# Patient Record
Sex: FEMALE
[De-identification: names, ages, dates, MRNs, and addresses within clinical notes are randomized per-mention and may not be internally consistent; named-entity substitution may affect disease eponyms.]

---

## 2017-04-12 ENCOUNTER — CHARTING TRANS (OUTPATIENT)
Dept: OTHER | Age: 37
End: 2017-04-12

## 2017-04-13 ENCOUNTER — CHARTING TRANS (OUTPATIENT)
Dept: SPORTS MEDICINE | Age: 37
End: 2017-04-13

## 2017-04-13 ENCOUNTER — IMAGING SERVICES (OUTPATIENT)
Dept: OTHER | Age: 37
End: 2017-04-13

## 2017-05-02 ENCOUNTER — CHARTING TRANS (OUTPATIENT)
Dept: OTHER | Age: 37
End: 2017-05-02

## 2017-05-04 ENCOUNTER — CHARTING TRANS (OUTPATIENT)
Dept: OTHER | Age: 37
End: 2017-05-04

## 2017-05-18 ENCOUNTER — CHARTING TRANS (OUTPATIENT)
Dept: OTHER | Age: 37
End: 2017-05-18

## 2017-07-18 PROBLEM — E11.65 CONTROLLED TYPE 2 DIABETES MELLITUS WITH HYPERGLYCEMIA, WITHOUT LONG-TERM CURRENT USE OF INSULIN (HCC): Status: ACTIVE | Noted: 2017-07-18

## 2017-08-04 PROBLEM — K61.1 PERIRECTAL ABSCESS: Status: ACTIVE | Noted: 2017-08-04

## 2018-02-02 PROBLEM — E11.69 TYPE 2 DIABETES MELLITUS WITH HYPERLIPIDEMIA (HCC): Status: ACTIVE | Noted: 2018-02-02

## 2018-02-02 PROBLEM — E11.69 TYPE 2 DIABETES MELLITUS WITH HYPERTRIGLYCERIDEMIA (HCC): Status: ACTIVE | Noted: 2018-02-02

## 2018-02-02 PROBLEM — E78.1 TYPE 2 DIABETES MELLITUS WITH HYPERTRIGLYCERIDEMIA (HCC): Status: ACTIVE | Noted: 2018-02-02

## 2018-02-02 PROBLEM — E78.5 TYPE 2 DIABETES MELLITUS WITH HYPERLIPIDEMIA (HCC): Status: ACTIVE | Noted: 2018-02-02

## 2018-11-28 VITALS
HEART RATE: 88 BPM | HEIGHT: 67 IN | RESPIRATION RATE: 16 BRPM | WEIGHT: 210 LBS | BODY MASS INDEX: 32.96 KG/M2 | DIASTOLIC BLOOD PRESSURE: 79 MMHG | SYSTOLIC BLOOD PRESSURE: 115 MMHG

## 2025-04-23 ENCOUNTER — OFFICE VISIT (OUTPATIENT)
Facility: CLINIC | Age: 45
End: 2025-04-23
Payer: MEDICAID

## 2025-04-23 ENCOUNTER — LAB ENCOUNTER (OUTPATIENT)
Dept: LAB | Age: 45
End: 2025-04-23
Attending: Other
Payer: MEDICAID

## 2025-04-23 VITALS
HEART RATE: 86 BPM | DIASTOLIC BLOOD PRESSURE: 62 MMHG | BODY MASS INDEX: 30 KG/M2 | SYSTOLIC BLOOD PRESSURE: 104 MMHG | RESPIRATION RATE: 16 BRPM | WEIGHT: 189 LBS

## 2025-04-23 DIAGNOSIS — G62.9 POLYNEUROPATHY: Primary | ICD-10-CM

## 2025-04-23 DIAGNOSIS — G62.9 POLYNEUROPATHY: ICD-10-CM

## 2025-04-23 PROCEDURE — 83521 IG LIGHT CHAINS FREE EACH: CPT

## 2025-04-23 PROCEDURE — 83921 ORGANIC ACID SINGLE QUANT: CPT

## 2025-04-23 PROCEDURE — 86334 IMMUNOFIX E-PHORESIS SERUM: CPT

## 2025-04-23 PROCEDURE — 36415 COLL VENOUS BLD VENIPUNCTURE: CPT

## 2025-04-23 PROCEDURE — 84207 ASSAY OF VITAMIN B-6: CPT

## 2025-04-23 PROCEDURE — 99204 OFFICE O/P NEW MOD 45 MIN: CPT | Performed by: OTHER

## 2025-04-23 PROCEDURE — 84446 ASSAY OF VITAMIN E: CPT

## 2025-04-23 PROCEDURE — 82525 ASSAY OF COPPER: CPT

## 2025-04-23 PROCEDURE — 84630 ASSAY OF ZINC: CPT

## 2025-04-23 PROCEDURE — 84425 ASSAY OF VITAMIN B-1: CPT

## 2025-04-23 PROCEDURE — 84165 PROTEIN E-PHORESIS SERUM: CPT

## 2025-04-23 RX ORDER — ATORVASTATIN CALCIUM 40 MG/1
40 TABLET, FILM COATED ORAL DAILY
COMMUNITY
Start: 2024-12-04

## 2025-04-23 RX ORDER — GLIPIZIDE 5 MG/1
5 TABLET ORAL
COMMUNITY
Start: 2025-03-05

## 2025-04-23 RX ORDER — METHION/INOS/CHOL BT/B COM/LIV 110MG-86MG
CAPSULE ORAL
COMMUNITY
Start: 2025-04-07

## 2025-04-23 RX ORDER — GABAPENTIN 600 MG/1
600 TABLET ORAL 3 TIMES DAILY
COMMUNITY
Start: 2025-02-18

## 2025-04-23 RX ORDER — MULTIVITAMIN
1 TABLET ORAL DAILY
COMMUNITY
Start: 2025-01-15

## 2025-04-23 RX ORDER — PREGABALIN 50 MG/1
CAPSULE ORAL
Qty: 60 CAPSULE | Refills: 2 | Status: SHIPPED | OUTPATIENT
Start: 2025-04-23

## 2025-04-23 RX ORDER — DOCUSATE SODIUM 100 MG/1
100 CAPSULE, LIQUID FILLED ORAL 2 TIMES DAILY
COMMUNITY
Start: 2025-02-25

## 2025-04-23 NOTE — PATIENT INSTRUCTIONS
Instructions from Dr. Garza:       Start lyrica (pregabalin) 50 mg at bedtime x 2 weeks, then increase to 2 times a day.    Then, reduce gabapentin to 600 mg to only at bedtime 2 weeks, then stop.    ~~~~~~~~~~~~~~~~~~~~~~~  After your visit at the [facility]  today,  please direct any follow up questions or medication needs to the staff in our [place] office so that your concerns may be promptly addressed.  We are available through fl3ur or at the numbers below:    The phone number is:   [phone] option #1    The fax number is:  [phone]    Your pharmacy should also send any requests electronically to the [place] office.    Refill policies:    Allow 2-3 business days for refills; controlled substances may take longer.  Contact your pharmacy at least 5 days prior to running out of medication and have them send an electronic request or submit request through the “request refill” option in your fl3ur account.  Refills are not addressed on weekends; covering physicians do not authorize routine medications on weekends.  No narcotics or controlled substances are refilled after noon on Fridays or by on call physicians.  By law, narcotics must be electronically prescribed.  A 30 day supply with no refills is the maximum allowed.  If your prescription is due for a refill, you may be due for a follow up appointment.  To best provide you care, patients receiving routine medications need to be seen at least once a year.  Patients receiving narcotic/controlled substance medications need to be seen at least once every 3 months.  In the event that your preferred pharmacy does not have the requested medication in stock (e.g. Backordered), it is your responsibility to find another pharmacy that has the requested medication available.  We will gladly send a new prescription to that pharmacy at your request.    Scheduling Tests:    If your physician has ordered radiology tests such as MRI or CT scans,  please contact Central Scheduling at [phone] right away to schedule the test.  Once scheduled, the [facility] Centralized Referral Team will work with your insurance carrier to obtain pre-certification or prior authorization.  Depending on your insurance carrier, approval may take 3-10 days.  It is highly recommended patients assure they have received an authorization before having a test performed.  If test is done without insurance authorization, patient may be responsible for the entire amount billed.      Precertification and Prior Authorizations:  If your physician has recommended that you have a procedure or additional testing performed the [facility] Centralized Referral Team will contact your insurance carrier to obtain pre-certification or prior authorization.    You are strongly encouraged to contact your insurance carrier to verify that your procedure/test has been approved and is a COVERED benefit.  Although the [facility] Centralized Referral Team does its due diligence, the insurance carrier gives the disclaimer that \"Although the procedure is authorized, this does not guarantee payment.\"    Ultimately the patient is responsible for payment.   Thank you for your understanding in this matter.  Paperwork Completion:  If you require FMLA or disability paperwork for your recovery, please make sure to either drop it off or have it faxed to our office at [phone]. Be sure the form has your name and date of birth on it.  The form will be faxed to our Forms Department and they will complete it for you.  There is a 25$ fee for all forms that need to be filled out.  Please be aware there is a 10-14 day turnaround time.  You will need to sign a release of information (CLAUDE) form if your paperwork does not come with one.  You may call the Forms Department with any questions at [phone].  Their fax number is [phone].

## 2025-04-23 NOTE — PROGRESS NOTES
Neurology History & Physical     ASSESSMENT & PLAN:      ICD-10-CM    1. Polyneuropathy  G62.9 Methylmalonic Acid, Serum     Monoclonal Protein Study     Vitamin B1 (Thiamine), Blood     Vitamin B6     Vitamin E, Serum     Copper, Serum     Zinc, Plasma Or Serum     pregabalin 50 MG Oral Cap        Polyneuropathy, likely diabetic but that is relatively well controlled in the past 6 months.  Check SPEP, MMA, B1, B6, E, Cu, Zn.  Start PGB titration and then taper off GBP.  Consider ALA and compounded creams in future.  Avoid TCAs given strong depression, anxiety, and PTSD history.    We discussed medication side effects.    Return in about 3 months (around 7/23/2025).       ~~~~~~~~~~~~~~~~~~~~~~~~~~~    CHIEF COMPLAINT / REASON FOR VISIT:    Chief Complaint   Patient presents with    Neurologic Problem     Numbness/tingling in feet and tips of fingers. Feet are worse, trouble with balance. Started in 2021     HISTORY OBTAINED FROM:  Patient and others as above  Chart review    HISTORY:  Mariah Perez is a 45 year old female with DM since 2017, developed BLE numbness for several years, diagnosed with \"severe neuropathy\" in 2022.  Now has bilat fingertips and feet numbness, as well as pain and tingling.  Has poor balance, no falls though has caught herself.  Did PT in 2024, stopped it due to lack of improvement.    Had EMG 2 years ago.  Has been on GBP for years without benefit.  Currently on  mg TID.    No back or neck pain.    No bladder issues.  Has chronic constipation, not new issue.    Previous medication trials:  Cymbalta - side effects  Maybe amitriptyline for depression in past  Maybe Lyrica for pain in past    DATA REVIEWED:  As documented in the history    March 2025  TSH, CMP unremarkable  A1c 7.1  FM NPP note     June 2024  B12 422, Folate unremarkable  A1c 13.1    PHYSICAL EXAMINATION:  /62   Pulse 86   Resp 16   Wt 189 lb (85.7 kg)   BMI 30.05 kg/m²     Gen: in NAD  MSE: AAOx3, nl  language, nl attn/conc, nl fund of knowledge  CN: PERRL, VFF; EOMI, no nystagmus; nl facial mvmt bilaterally; nl hearing bilaterally; nl palate elevation bilaterally; nl voice; nl shoulder shrug b/I; nl tongue movement  Motor: 5/5 x4; no drift; normal tone; no abnormal movements  Sensory:   vibration: normal at bilat wrists; decreased in bilat fingertips and bilat knees, markedly decreased in right > left ankle, nearly absent at toes  temp: reduced below bilat ankles, absent in bilat toes  Coord: nl FTN b/I  Reflex: 2+ BUE and BLE  Gait: normal    Allergies   Allergen Reactions    Duloxetine RASH     Current medications:  Current Outpatient Medications on File Prior to Visit   Medication Sig Dispense Refill    gabapentin 600 MG Oral Tab Take 1 tablet (600 mg total) by mouth 3 (three) times daily.      atorvastatin 40 MG Oral Tab Take 1 tablet (40 mg total) by mouth daily.      Levonorgestrel 20 MCG/DAY Intrauterine IUD 20 mcg (1 each total) by Intrauterine route.      docusate sodium 100 MG Oral Cap Take 1 capsule (100 mg total) by mouth 2 (two) times daily.      Multiple Vitamin (MULTI-VITAMIN) Oral Tab Take 1 tablet by mouth daily.      Thiamine HCl (B-1) 100 MG Oral Tab       glipiZIDE 5 MG Oral Tab Take 1 tablet (5 mg total) by mouth every morning before breakfast.      metFORMIN HCl 1000 MG Oral Tab Take 1 tablet (1,000 mg total) by mouth 2 (two) times daily with meals.      folic acid 1 MG Oral Tab   0    Glucose Blood (DEBBIE CONTOUR TEST) In Vitro Strip Check twice a day 200 strip 1    MICROLET LANCETS Does not apply Misc Check twice a day 200 each 1    ferrous sulfate 325 (65 FE) MG Oral Tab EC Take 325 mg by mouth daily with breakfast.       No current facility-administered medications on file prior to visit.        Past Medical History:    Anxiety    Depression    Diabetes (HCC)    Endometriosis    Hyperlipidemia    Hypertriglyceridemia    Iron deficiency    Neuropathy    PTSD (post-traumatic stress  disorder)       Past Surgical History:   Procedure Laterality Date    Foot surgery  2008    Hand surgery Right     trigger fingers x2  2019       Social History     Socioeconomic History    Marital status: Single   Tobacco Use    Smoking status: Every Day     Current packs/day: 0.50     Average packs/day: 0.5 packs/day for 16.0 years (8.0 ttl pk-yrs)     Types: Cigarettes    Smokeless tobacco: Never   Vaping Use    Vaping status: Never Used   Substance and Sexual Activity    Alcohol use: Not Currently     Comment: 3-4 times/week 5 shots at at time. sober 31 months 4/23/25    Drug use: No   Other Topics Concern    Caffeine Concern No     Comment: yes but rare    Exercise No       Family History   Problem Relation Age of Onset    No Known Problems Father     Other (Other) Mother         Geovanna barre    Diabetes Paternal Grandmother     Cancer Paternal Grandmother         unknown    Alcohol abuse Paternal Grandfather     Cancer Maternal Grandmother         lymphoma    No Known Problems Maternal Grandfather        Aggie Garza MD, FAES, FAAN  Board-Certified in Neurology, Epilepsy, and Clinical Neurophysiology  [facility] [place]

## 2025-04-24 LAB
ALBUMIN SERPL ELPH-MCNC: 4.26 G/DL (ref 3.75–5.21)
ALBUMIN/GLOB SERPL: 1.4 {RATIO} (ref 1–2)
ALPHA1 GLOB SERPL ELPH-MCNC: 0.31 G/DL (ref 0.19–0.46)
ALPHA2 GLOB SERPL ELPH-MCNC: 1 G/DL (ref 0.48–1.05)
B-GLOBULIN SERPL ELPH-MCNC: 1.01 G/DL (ref 0.68–1.23)
GAMMA GLOB SERPL ELPH-MCNC: 0.72 G/DL (ref 0.62–1.7)
KAPPA LC FREE SER-MCNC: 1.52 MG/DL (ref 0.33–1.94)
KAPPA LC FREE/LAMBDA FREE SER NEPH: 0.91 {RATIO} (ref 0.26–1.65)
LAMBDA LC FREE SERPL-MCNC: 1.67 MG/DL (ref 0.57–2.63)
PROT SERPL-MCNC: 7.3 G/DL (ref 5.7–8.2)

## 2025-04-26 LAB — VITAMIN B1 WHOLE BLD: 190.1 NMOL/L

## 2025-04-27 LAB
VITAMIN B6: 6.2 UG/L
ZINC: 102 UG/DL

## 2025-04-28 LAB
COPPER: 134 UG/DL
METHYLMALONIC ACID: 85 NMOL/L
VIT E ALPHA TOCO: 26.4 MG/L
VIT E GAMMA TOCO: 8.2 MG/L

## 2025-05-07 ENCOUNTER — OFFICE VISIT (OUTPATIENT)
Dept: OCCUPATIONAL MEDICINE | Age: 45
End: 2025-05-07

## 2025-05-07 DIAGNOSIS — Z00.8 HEALTH EXAMINATION IN POPULATION SURVEYS: Primary | ICD-10-CM

## 2025-05-07 PROCEDURE — OH179 RAPID TEST DRUG KIT AND COLLECTION 4 PANEL: Performed by: PHYSICIAN ASSISTANT
